# Patient Record
Sex: MALE | Race: WHITE | NOT HISPANIC OR LATINO | Employment: FULL TIME | URBAN - METROPOLITAN AREA
[De-identification: names, ages, dates, MRNs, and addresses within clinical notes are randomized per-mention and may not be internally consistent; named-entity substitution may affect disease eponyms.]

---

## 2020-04-06 ENCOUNTER — APPOINTMENT (OUTPATIENT)
Dept: GENERAL RADIOLOGY | Age: 60
End: 2020-04-06
Attending: EMERGENCY MEDICINE

## 2020-04-06 ENCOUNTER — HOSPITAL ENCOUNTER (EMERGENCY)
Age: 60
Discharge: HOME | End: 2020-04-06
Attending: EMERGENCY MEDICINE

## 2020-04-06 VITALS
HEART RATE: 86 BPM | WEIGHT: 208.34 LBS | DIASTOLIC BLOOD PRESSURE: 99 MMHG | RESPIRATION RATE: 20 BRPM | SYSTOLIC BLOOD PRESSURE: 154 MMHG | HEIGHT: 72 IN | TEMPERATURE: 98 F | OXYGEN SATURATION: 91 % | BODY MASS INDEX: 28.22 KG/M2

## 2020-04-06 DIAGNOSIS — R07.9 CHEST PAIN, UNSPECIFIED TYPE: Primary | ICD-10-CM

## 2020-04-06 DIAGNOSIS — B97.89 VIRAL RESPIRATORY INFECTION: ICD-10-CM

## 2020-04-06 DIAGNOSIS — J98.8 VIRAL RESPIRATORY INFECTION: ICD-10-CM

## 2020-04-06 LAB
ALBUMIN SERPL-MCNC: 4.2 G/DL (ref 3.6–5.1)
ALBUMIN/GLOB SERPL: 1.1 {RATIO} (ref 1–2.4)
ALP SERPL-CCNC: 100 UNITS/L (ref 45–117)
ALT SERPL-CCNC: 64 UNITS/L
ANION GAP SERPL CALC-SCNC: 12 MMOL/L (ref 10–20)
AST SERPL-CCNC: 29 UNITS/L
BASOPHILS # BLD: 0.1 K/MCL (ref 0–0.3)
BASOPHILS NFR BLD: 1 %
BILIRUB SERPL-MCNC: 0.5 MG/DL (ref 0.2–1)
BUN SERPL-MCNC: 14 MG/DL (ref 6–20)
BUN/CREAT SERPL: 13 (ref 7–25)
CALCIUM SERPL-MCNC: 9.7 MG/DL (ref 8.4–10.2)
CHLORIDE SERPL-SCNC: 106 MMOL/L (ref 98–107)
CO2 SERPL-SCNC: 23 MMOL/L (ref 21–32)
CREAT SERPL-MCNC: 1.08 MG/DL (ref 0.67–1.17)
DIFFERENTIAL METHOD BLD: ABNORMAL
EOSINOPHIL # BLD: 0.1 K/MCL (ref 0.1–0.5)
EOSINOPHIL NFR BLD: 2 %
ERYTHROCYTE [DISTWIDTH] IN BLOOD: 12.8 % (ref 11–15)
GLOBULIN SER-MCNC: 3.8 G/DL (ref 2–4)
GLUCOSE SERPL-MCNC: 112 MG/DL (ref 65–99)
HCT VFR BLD CALC: 53.9 % (ref 39–51)
HGB BLD-MCNC: 18.5 G/DL (ref 13–17)
IMM GRANULOCYTES # BLD AUTO: 0.1 K/MCL (ref 0–0.2)
IMM GRANULOCYTES NFR BLD: 1 %
LYMPHOCYTES # BLD: 1.6 K/MCL (ref 1–4)
LYMPHOCYTES NFR BLD: 24 %
MCH RBC QN AUTO: 32.1 PG (ref 26–34)
MCHC RBC AUTO-ENTMCNC: 34.3 G/DL (ref 32–36.5)
MCV RBC AUTO: 93.6 FL (ref 78–100)
MONOCYTES # BLD: 0.4 K/MCL (ref 0.3–0.9)
MONOCYTES NFR BLD: 6 %
NEUTROPHILS # BLD: 4.4 K/MCL (ref 1.8–7.7)
NEUTROPHILS NFR BLD: 66 %
NRBC BLD MANUAL-RTO: 0 /100 WBC
NT-PROBNP SERPL-MCNC: 23 PG/ML
PLATELET # BLD: 185 K/MCL (ref 140–450)
POTASSIUM SERPL-SCNC: 4.2 MMOL/L (ref 3.4–5.1)
PROT SERPL-MCNC: 8 G/DL (ref 6.4–8.2)
RBC # BLD: 5.76 MIL/MCL (ref 4.5–5.9)
SODIUM SERPL-SCNC: 137 MMOL/L (ref 135–145)
TROPONIN I SERPL HS-MCNC: <0.02 NG/ML
WBC # BLD: 6.7 K/MCL (ref 4.2–11)

## 2020-04-06 PROCEDURE — 80053 COMPREHEN METABOLIC PANEL: CPT

## 2020-04-06 PROCEDURE — 99285 EMERGENCY DEPT VISIT HI MDM: CPT

## 2020-04-06 PROCEDURE — 84484 ASSAY OF TROPONIN QUANT: CPT

## 2020-04-06 PROCEDURE — 71045 X-RAY EXAM CHEST 1 VIEW: CPT

## 2020-04-06 PROCEDURE — 85025 COMPLETE CBC W/AUTO DIFF WBC: CPT

## 2020-04-06 PROCEDURE — 36415 COLL VENOUS BLD VENIPUNCTURE: CPT

## 2020-04-06 PROCEDURE — 83880 ASSAY OF NATRIURETIC PEPTIDE: CPT

## 2020-04-06 PROCEDURE — 93005 ELECTROCARDIOGRAM TRACING: CPT | Performed by: EMERGENCY MEDICINE

## 2020-04-06 ASSESSMENT — PAIN SCALES - GENERAL: PAINLEVEL_OUTOF10: 4

## 2020-04-06 ASSESSMENT — PAIN DESCRIPTION - PAIN TYPE: TYPE: ACUTE PAIN

## 2020-04-07 LAB
ATRIAL RATE (BPM): 83
P AXIS (DEGREES): 52
PR-INTERVAL (MSEC): 130
QRS-INTERVAL (MSEC): 122
QT-INTERVAL (MSEC): 372
QTC: 437
R AXIS (DEGREES): 90
REPORT TEXT: NORMAL
T AXIS (DEGREES): 11
VENTRICULAR RATE EKG/MIN (BPM): 83

## 2021-08-08 ENCOUNTER — WALK IN (OUTPATIENT)
Dept: URGENT CARE | Age: 61
End: 2021-08-08
Attending: EMERGENCY MEDICINE

## 2021-08-08 VITALS
TEMPERATURE: 97.6 F | RESPIRATION RATE: 18 BRPM | OXYGEN SATURATION: 97 % | HEART RATE: 88 BPM | DIASTOLIC BLOOD PRESSURE: 83 MMHG | BODY MASS INDEX: 27.12 KG/M2 | WEIGHT: 200 LBS | SYSTOLIC BLOOD PRESSURE: 130 MMHG

## 2021-08-08 DIAGNOSIS — S61.209A EXTENSOR TENDON LACERATION OF FINGER WITH OPEN WOUND, INITIAL ENCOUNTER: ICD-10-CM

## 2021-08-08 DIAGNOSIS — S56.429A EXTENSOR TENDON LACERATION OF FINGER WITH OPEN WOUND, INITIAL ENCOUNTER: ICD-10-CM

## 2021-08-08 DIAGNOSIS — S61.012A LACERATION OF LEFT THUMB WITHOUT DAMAGE TO NAIL, FOREIGN BODY PRESENCE UNSPECIFIED, INITIAL ENCOUNTER: Primary | ICD-10-CM

## 2021-08-08 PROCEDURE — 90471 IMMUNIZATION ADMIN: CPT | Performed by: PHYSICIAN ASSISTANT

## 2021-08-08 PROCEDURE — 99212 OFFICE O/P EST SF 10 MIN: CPT

## 2021-08-08 PROCEDURE — 12002 RPR S/N/AX/GEN/TRNK2.6-7.5CM: CPT

## 2021-08-08 PROCEDURE — 10002800 HB RX 250 W HCPCS: Performed by: PHYSICIAN ASSISTANT

## 2021-08-08 PROCEDURE — 10002803 HB RX 637: Performed by: PHYSICIAN ASSISTANT

## 2021-08-08 PROCEDURE — 90715 TDAP VACCINE 7 YRS/> IM: CPT | Performed by: PHYSICIAN ASSISTANT

## 2021-08-08 PROCEDURE — 10002801 HB RX 250 W/O HCPCS: Performed by: PHYSICIAN ASSISTANT

## 2021-08-08 RX ORDER — LISINOPRIL 10 MG/1
10 TABLET ORAL DAILY
COMMUNITY
Start: 2021-07-19

## 2021-08-08 RX ORDER — BUPIVACAINE HYDROCHLORIDE 2.5 MG/ML
10 INJECTION, SOLUTION EPIDURAL; INFILTRATION; INTRACAUDAL ONCE
Status: COMPLETED | OUTPATIENT
Start: 2021-08-08 | End: 2021-08-08

## 2021-08-08 RX ORDER — IBUPROFEN 600 MG/1
600 TABLET ORAL ONCE
Status: COMPLETED | OUTPATIENT
Start: 2021-08-08 | End: 2021-08-08

## 2021-08-08 RX ORDER — CEPHALEXIN 500 MG/1
500 CAPSULE ORAL 4 TIMES DAILY
Qty: 28 CAPSULE | Refills: 0 | Status: SHIPPED | OUTPATIENT
Start: 2021-08-08 | End: 2021-08-15

## 2021-08-08 RX ORDER — ATORVASTATIN CALCIUM 20 MG/1
20 TABLET, FILM COATED ORAL DAILY
COMMUNITY
Start: 2021-07-19

## 2021-08-08 RX ORDER — IBUPROFEN 600 MG/1
600 TABLET ORAL EVERY 6 HOURS PRN
Qty: 20 TABLET | Refills: 0 | Status: SHIPPED | OUTPATIENT
Start: 2021-08-08 | End: 2021-08-13

## 2021-08-08 RX ADMIN — IBUPROFEN 600 MG: 600 TABLET, FILM COATED ORAL at 13:54

## 2021-08-08 RX ADMIN — BUPIVACAINE HYDROCHLORIDE 25 MG: 2.5 INJECTION, SOLUTION EPIDURAL; INFILTRATION; INTRACAUDAL; PERINEURAL at 13:18

## 2021-08-08 RX ADMIN — TETANUS TOXOID, REDUCED DIPHTHERIA TOXOID AND ACELLULAR PERTUSSIS VACCINE, ADSORBED 0.5 ML: 5; 2.5; 8; 8; 2.5 SUSPENSION INTRAMUSCULAR at 13:14

## 2021-08-08 ASSESSMENT — PAIN SCALES - GENERAL
PAINLEVEL: 7
PAINLEVEL: 7

## 2021-08-08 ASSESSMENT — ENCOUNTER SYMPTOMS
CONSTITUTIONAL NEGATIVE: 1
NEUROLOGICAL NEGATIVE: 1
WOUND: 1
RESPIRATORY NEGATIVE: 1
GASTROINTESTINAL NEGATIVE: 1

## 2022-06-22 ENCOUNTER — OFFICE VISIT (OUTPATIENT)
Dept: FAMILY MEDICINE CLINIC | Facility: CLINIC | Age: 62
End: 2022-06-22
Payer: COMMERCIAL

## 2022-06-22 VITALS
TEMPERATURE: 98 F | RESPIRATION RATE: 16 BRPM | SYSTOLIC BLOOD PRESSURE: 138 MMHG | DIASTOLIC BLOOD PRESSURE: 70 MMHG | WEIGHT: 200 LBS | OXYGEN SATURATION: 96 % | BODY MASS INDEX: 27.09 KG/M2 | HEART RATE: 82 BPM | HEIGHT: 72 IN

## 2022-06-22 DIAGNOSIS — R05.9 COUGH: ICD-10-CM

## 2022-06-22 DIAGNOSIS — J02.0 STREP THROAT: Primary | ICD-10-CM

## 2022-06-22 LAB
CONTROL LINE PRESENT WITH A CLEAR BACKGROUND (YES/NO): YES YES/NO
KIT LOT #: ABNORMAL NUMERIC
STREP GRP A CUL-SCR: POSITIVE

## 2022-06-22 PROCEDURE — 3008F BODY MASS INDEX DOCD: CPT | Performed by: NURSE PRACTITIONER

## 2022-06-22 PROCEDURE — 99202 OFFICE O/P NEW SF 15 MIN: CPT | Performed by: NURSE PRACTITIONER

## 2022-06-22 PROCEDURE — 3075F SYST BP GE 130 - 139MM HG: CPT | Performed by: NURSE PRACTITIONER

## 2022-06-22 PROCEDURE — 3078F DIAST BP <80 MM HG: CPT | Performed by: NURSE PRACTITIONER

## 2022-06-22 PROCEDURE — 87880 STREP A ASSAY W/OPTIC: CPT | Performed by: NURSE PRACTITIONER

## 2022-06-22 RX ORDER — LISINOPRIL 20 MG/1
20 TABLET ORAL DAILY
COMMUNITY
Start: 2022-05-26

## 2022-06-22 RX ORDER — ATORVASTATIN CALCIUM 20 MG/1
20 TABLET, FILM COATED ORAL DAILY
COMMUNITY
Start: 2022-05-26

## 2022-06-22 RX ORDER — AMOXICILLIN 500 MG/1
500 CAPSULE ORAL 3 TIMES DAILY
Qty: 30 CAPSULE | Refills: 0 | Status: SHIPPED | OUTPATIENT
Start: 2022-06-22 | End: 2022-07-02

## 2022-06-23 LAB — SARS-COV-2 RNA RESP QL NAA+PROBE: NOT DETECTED

## 2022-08-23 ENCOUNTER — OFFICE VISIT (OUTPATIENT)
Dept: FAMILY MEDICINE CLINIC | Facility: CLINIC | Age: 62
End: 2022-08-23
Payer: COMMERCIAL

## 2022-08-23 VITALS
SYSTOLIC BLOOD PRESSURE: 145 MMHG | DIASTOLIC BLOOD PRESSURE: 86 MMHG | BODY MASS INDEX: 25.67 KG/M2 | TEMPERATURE: 98 F | HEART RATE: 100 BPM | OXYGEN SATURATION: 97 % | HEIGHT: 74 IN | WEIGHT: 200 LBS | RESPIRATION RATE: 22 BRPM

## 2022-08-23 DIAGNOSIS — U07.1 COVID-19: ICD-10-CM

## 2022-08-23 DIAGNOSIS — Z11.52 ENCOUNTER FOR SCREENING FOR COVID-19: ICD-10-CM

## 2022-08-23 DIAGNOSIS — Z20.822 SUSPECTED COVID-19 VIRUS INFECTION: Primary | ICD-10-CM

## 2022-08-23 LAB
OPERATOR ID: ABNORMAL
POCT LOT NUMBER: ABNORMAL
RAPID SARS-COV-2 BY PCR: DETECTED

## 2022-08-23 PROCEDURE — U0002 COVID-19 LAB TEST NON-CDC: HCPCS | Performed by: NURSE PRACTITIONER

## 2022-08-23 PROCEDURE — 3079F DIAST BP 80-89 MM HG: CPT | Performed by: NURSE PRACTITIONER

## 2022-08-23 PROCEDURE — 3008F BODY MASS INDEX DOCD: CPT | Performed by: NURSE PRACTITIONER

## 2022-08-23 PROCEDURE — 3077F SYST BP >= 140 MM HG: CPT | Performed by: NURSE PRACTITIONER

## 2022-08-23 PROCEDURE — 99213 OFFICE O/P EST LOW 20 MIN: CPT | Performed by: NURSE PRACTITIONER

## 2023-01-21 ENCOUNTER — OFFICE VISIT (OUTPATIENT)
Dept: FAMILY MEDICINE CLINIC | Facility: CLINIC | Age: 63
End: 2023-01-21
Payer: COMMERCIAL

## 2023-01-21 VITALS
OXYGEN SATURATION: 100 % | RESPIRATION RATE: 20 BRPM | DIASTOLIC BLOOD PRESSURE: 81 MMHG | TEMPERATURE: 99 F | SYSTOLIC BLOOD PRESSURE: 167 MMHG | HEART RATE: 94 BPM

## 2023-01-21 DIAGNOSIS — I10 ELEVATED BLOOD PRESSURE READING WITH DIAGNOSIS OF HYPERTENSION: ICD-10-CM

## 2023-01-21 DIAGNOSIS — J20.8 ACUTE BRONCHITIS, VIRAL: Primary | ICD-10-CM

## 2023-01-21 PROCEDURE — 99213 OFFICE O/P EST LOW 20 MIN: CPT

## 2023-01-21 PROCEDURE — 3079F DIAST BP 80-89 MM HG: CPT

## 2023-01-21 PROCEDURE — 3077F SYST BP >= 140 MM HG: CPT

## 2023-01-22 ENCOUNTER — E-VISIT (OUTPATIENT)
Dept: TELEHEALTH | Age: 63
End: 2023-01-22

## 2023-01-22 DIAGNOSIS — Z02.9 ADMINISTRATIVE ENCOUNTER: Primary | ICD-10-CM

## 2023-01-22 NOTE — PROGRESS NOTES
Pt with sore throat, exposure to strep. Advised can place order for strep testing at [facility] or can been seen in person at [facility]. Pt prefers to go to [facility]. No charge.

## 2023-08-11 ENCOUNTER — OFFICE VISIT (OUTPATIENT)
Dept: FAMILY MEDICINE CLINIC | Facility: CLINIC | Age: 63
End: 2023-08-11
Payer: COMMERCIAL

## 2023-08-11 VITALS
DIASTOLIC BLOOD PRESSURE: 83 MMHG | HEIGHT: 72 IN | HEART RATE: 92 BPM | SYSTOLIC BLOOD PRESSURE: 138 MMHG | BODY MASS INDEX: 27.09 KG/M2 | WEIGHT: 200 LBS | RESPIRATION RATE: 18 BRPM | OXYGEN SATURATION: 97 %

## 2023-08-11 DIAGNOSIS — N30.00 ACUTE CYSTITIS WITHOUT HEMATURIA: ICD-10-CM

## 2023-08-11 DIAGNOSIS — R39.15 URGENCY OF URINATION: Primary | ICD-10-CM

## 2023-08-11 DIAGNOSIS — R35.0 URINARY FREQUENCY: ICD-10-CM

## 2023-08-11 LAB
BILIRUBIN: NEGATIVE
GLUCOSE (URINE DIPSTICK): NEGATIVE MG/DL
KETONES (URINE DIPSTICK): NEGATIVE MG/DL
MULTISTIX LOT#: ABNORMAL NUMERIC
NITRITE, URINE: NEGATIVE
PH, URINE: 5.5 (ref 4.5–8)
PROTEIN (URINE DIPSTICK): 30 MG/DL
SPECIFIC GRAVITY: 1.02 (ref 1–1.03)
UROBILINOGEN,SEMI-QN: 0.2 MG/DL (ref 0–1.9)

## 2023-08-11 PROCEDURE — 87086 URINE CULTURE/COLONY COUNT: CPT | Performed by: NURSE PRACTITIONER

## 2023-08-11 PROCEDURE — 81003 URINALYSIS AUTO W/O SCOPE: CPT | Performed by: NURSE PRACTITIONER

## 2023-08-11 PROCEDURE — 87186 SC STD MICRODIL/AGAR DIL: CPT | Performed by: NURSE PRACTITIONER

## 2023-08-11 PROCEDURE — 99213 OFFICE O/P EST LOW 20 MIN: CPT | Performed by: NURSE PRACTITIONER

## 2023-08-11 PROCEDURE — 3075F SYST BP GE 130 - 139MM HG: CPT | Performed by: NURSE PRACTITIONER

## 2023-08-11 PROCEDURE — 3079F DIAST BP 80-89 MM HG: CPT | Performed by: NURSE PRACTITIONER

## 2023-08-11 PROCEDURE — 87088 URINE BACTERIA CULTURE: CPT | Performed by: NURSE PRACTITIONER

## 2023-08-11 PROCEDURE — 3008F BODY MASS INDEX DOCD: CPT | Performed by: NURSE PRACTITIONER

## 2023-08-11 RX ORDER — TRAZODONE HYDROCHLORIDE 50 MG/1
1 TABLET ORAL DAILY PRN
COMMUNITY

## 2023-08-11 RX ORDER — SULFAMETHOXAZOLE AND TRIMETHOPRIM 800; 160 MG/1; MG/1
1 TABLET ORAL 2 TIMES DAILY
Qty: 28 TABLET | Refills: 0 | Status: SHIPPED
Start: 2023-08-11 | End: 2023-08-25

## 2023-11-09 ENCOUNTER — OFFICE VISIT (OUTPATIENT)
Dept: FAMILY MEDICINE CLINIC | Facility: CLINIC | Age: 63
End: 2023-11-09
Payer: COMMERCIAL

## 2023-11-09 VITALS
OXYGEN SATURATION: 98 % | DIASTOLIC BLOOD PRESSURE: 72 MMHG | HEART RATE: 82 BPM | WEIGHT: 203 LBS | SYSTOLIC BLOOD PRESSURE: 128 MMHG | RESPIRATION RATE: 16 BRPM | TEMPERATURE: 98 F | BODY MASS INDEX: 27.5 KG/M2 | HEIGHT: 72 IN

## 2023-11-09 DIAGNOSIS — R07.89 CHEST TIGHTNESS: ICD-10-CM

## 2023-11-09 DIAGNOSIS — J06.9 URI WITH COUGH AND CONGESTION: Primary | ICD-10-CM

## 2023-11-09 PROBLEM — R94.6 THYROID FUNCTION TEST ABNORMAL: Status: ACTIVE | Noted: 2022-03-09

## 2023-11-09 PROBLEM — K63.5 POLYP OF COLON: Status: ACTIVE | Noted: 2022-03-10

## 2023-11-09 PROBLEM — I10 ESSENTIAL HYPERTENSION: Status: ACTIVE | Noted: 2021-10-21

## 2023-11-09 PROBLEM — K64.4 EXTERNAL HEMORRHOIDS: Status: ACTIVE | Noted: 2022-03-10

## 2023-11-09 PROBLEM — E78.00 HYPERCHOLESTEROLEMIA: Status: ACTIVE | Noted: 2022-06-06

## 2023-11-09 PROBLEM — N18.31 STAGE 3A CHRONIC KIDNEY DISEASE (HCC): Status: ACTIVE | Noted: 2022-03-09

## 2023-11-09 PROBLEM — N28.9 RENAL INSUFFICIENCY SYNDROME: Status: ACTIVE | Noted: 2022-09-14

## 2023-11-09 PROBLEM — E78.5 HYPERLIPIDEMIA: Status: ACTIVE | Noted: 2021-10-21

## 2023-11-09 PROBLEM — I12.9 BENIGN HYPERTENSIVE RENAL DISEASE: Status: ACTIVE | Noted: 2022-03-09

## 2023-11-09 PROBLEM — M54.16 LUMBAR RADICULOPATHY: Status: ACTIVE | Noted: 2022-05-26

## 2023-11-09 LAB
OPERATOR ID: NORMAL
POCT LOT NUMBER: NORMAL
RAPID SARS-COV-2 BY PCR: NOT DETECTED

## 2023-11-09 PROCEDURE — 3008F BODY MASS INDEX DOCD: CPT | Performed by: NURSE PRACTITIONER

## 2023-11-09 PROCEDURE — 3078F DIAST BP <80 MM HG: CPT | Performed by: NURSE PRACTITIONER

## 2023-11-09 PROCEDURE — U0002 COVID-19 LAB TEST NON-CDC: HCPCS | Performed by: NURSE PRACTITIONER

## 2023-11-09 PROCEDURE — 3074F SYST BP LT 130 MM HG: CPT | Performed by: NURSE PRACTITIONER

## 2023-11-09 PROCEDURE — 99213 OFFICE O/P EST LOW 20 MIN: CPT | Performed by: NURSE PRACTITIONER

## 2023-11-09 RX ORDER — ALBUTEROL SULFATE 90 UG/1
1-2 AEROSOL, METERED RESPIRATORY (INHALATION)
Qty: 8 G | Refills: 0 | Status: SHIPPED | OUTPATIENT
Start: 2023-11-09

## 2023-11-09 RX ORDER — CYCLOBENZAPRINE HCL 10 MG
10 TABLET ORAL NIGHTLY
COMMUNITY

## 2023-11-09 RX ORDER — HYDROCODONE BITARTRATE AND ACETAMINOPHEN 10; 325 MG/1; MG/1
1 TABLET ORAL EVERY 6 HOURS PRN
COMMUNITY

## 2023-11-09 NOTE — PATIENT INSTRUCTIONS
Tylenol and Motrin as needed  Rest, push fluids  Mucinex DM over the counter for nasal congestion/cough  START daily antihistamine (Zyrtec, Claritin, Allegra) and choose one of those. Take daily for 1-2 weeks to help with any post nasal drainage/sore throat  START Flonase nasal spray daily.  1 spray in each nostril  If you have trouble breathing/chest tightness - use Albuterol inhaler as needed  Return to care for new/worsening symptoms or if symptoms persist for 2+ weeks without any improvement

## 2024-05-25 ENCOUNTER — OFFICE VISIT (OUTPATIENT)
Dept: FAMILY MEDICINE CLINIC | Facility: CLINIC | Age: 64
End: 2024-05-25

## 2024-05-25 ENCOUNTER — HOSPITAL ENCOUNTER (OUTPATIENT)
Age: 64
Discharge: HOME | End: 2024-05-25

## 2024-05-25 VITALS
DIASTOLIC BLOOD PRESSURE: 90 MMHG | SYSTOLIC BLOOD PRESSURE: 148 MMHG | TEMPERATURE: 98 F | RESPIRATION RATE: 16 BRPM | HEART RATE: 77 BPM | OXYGEN SATURATION: 98 %

## 2024-05-25 VITALS
HEART RATE: 82 BPM | RESPIRATION RATE: 18 BRPM | SYSTOLIC BLOOD PRESSURE: 150 MMHG | DIASTOLIC BLOOD PRESSURE: 90 MMHG | OXYGEN SATURATION: 96 % | TEMPERATURE: 97 F

## 2024-05-25 DIAGNOSIS — T16.2XXA FOREIGN BODY OF LEFT EAR, INITIAL ENCOUNTER: Primary | ICD-10-CM

## 2024-05-25 DIAGNOSIS — H66.003 NON-RECURRENT ACUTE SUPPURATIVE OTITIS MEDIA OF BOTH EARS WITHOUT SPONTANEOUS RUPTURE OF TYMPANIC MEMBRANES: Primary | ICD-10-CM

## 2024-05-25 DIAGNOSIS — T16.2XXA FOREIGN BODY OF LEFT EAR, INITIAL ENCOUNTER: ICD-10-CM

## 2024-05-25 RX ORDER — FLUTICASONE PROPIONATE 50 MCG
2 SPRAY, SUSPENSION (ML) NASAL DAILY
Qty: 16 G | Refills: 0 | Status: SHIPPED | OUTPATIENT
Start: 2024-05-25 | End: 2024-06-24

## 2024-05-25 RX ORDER — AMOXICILLIN AND CLAVULANATE POTASSIUM 875; 125 MG/1; MG/1
1 TABLET, FILM COATED ORAL 2 TIMES DAILY
Qty: 14 TABLET | Refills: 0 | Status: SHIPPED | OUTPATIENT
Start: 2024-05-25 | End: 2024-06-01

## 2024-05-25 NOTE — PROGRESS NOTES
Patient here for evaluation of possible foreign body in left ear. Noted several days ago was using a q-tip and unsure if the tip fractured. Notes mild pressure and hearing loss. Has tried to remove with no relief at home.  No past medical history on file.  Past Surgical History:   Procedure Laterality Date    Colonoscopy  1995    not certain of results    Other surgical history  2011    shoulder    Removal of elbow bursa  10/25/2012    Procedure: EXCISION OLECRANON BURSA;  Surgeon: Lj Tinsley MD;  Location: [facility] [place] [facility]     Current Outpatient Medications on File Prior to Visit   Medication Sig Dispense Refill    cyclobenzaprine 10 MG Oral Tab Take 1 tablet (10 mg total) by mouth nightly. TAKE AT BEDTIME (Patient not taking: Reported on 5/25/2024)      HYDROcodone-acetaminophen  MG Oral Tab Take 1 tablet by mouth every 6 (six) hours as needed. (Patient not taking: Reported on 5/25/2024)      albuterol 108 (90 Base) MCG/ACT Inhalation Aero Soln Inhale 1-2 puffs into the lungs every 4 to 6 hours as needed for Shortness of Breath or Wheezing. (Patient not taking: Reported on 5/25/2024) 8 g 0    traZODone 50 MG Oral Tab Take 1 tablet (50 mg total) by mouth daily as needed for Sleep.      atorvastatin 20 MG Oral Tab Take 1 tablet (20 mg total) by mouth daily.      lisinopril 20 MG Oral Tab Take 1 tablet (20 mg total) by mouth daily.      Multiple Vitamins-Minerals (MULTIVITAMIN OR) Take  by mouth. Daily (Patient not taking: Reported on 5/25/2024)      PROTEIN OR Take by mouth. Protein shake (Patient not taking: Reported on 11/9/2023)       No current facility-administered medications on file prior to visit.     /90   Pulse 77   Temp 97.8 °F (36.6 °C) (Oral)   Resp 16   SpO2 98%     Upon exam noted cotton in left ear. Unable to remove in office with alligator forceps. Patient sent to  for removal. No charge for visit.

## 2024-05-25 NOTE — ED INITIAL ASSESSMENT (HPI)
Pt c/o Q-tip stuck in ear a couple days ago, [facility] and rpt they were unsuccessful at getting it out, and recommended PT come to [facility]

## 2024-05-25 NOTE — ED PROVIDER NOTES
Patient Seen in: [facility]      History     Chief Complaint   Patient presents with    FB in Ear     Stated Complaint: foreign body in ear    Subjective:   HPI    63yo M who comes in with complaints of muffled hearing and foreign body in left ear canal from a qtip. Patient went to [facility] but they were unable to remove. Patient has had some ear pain prior to getting cotton stuck. Denies F/C.    Objective:   History reviewed. No pertinent past medical history.           Past Surgical History:   Procedure Laterality Date    Colonoscopy      not certain of results    Other surgical history      shoulder    Removal of elbow bursa  10/25/2012    Procedure: EXCISION OLECRANON BURSA;  Surgeon: Lj Tinsley MD;  Location: [facility]                Social History     Socioeconomic History    Marital status:    Tobacco Use    Smoking status: Light Smoker     Current packs/day: 0.00     Types: Cigarettes     Last attempt to quit: 2012     Years since quittin.6    Smokeless tobacco: Never    Tobacco comments:     cigar occassinally    Substance and Sexual Activity    Alcohol use: Yes     Comment: socially    Drug use: No     Social Determinants of Health      Received from [facility]              Review of Systems    Positive for stated complaint: foreign body in ear  Other systems are as noted in HPI.  Constitutional and vital signs reviewed.      All other systems reviewed and negative except as noted above.    Physical Exam     ED Triage Vitals [24 1119]   /90   Pulse 82   Resp 18   Temp 97.3 °F (36.3 °C)   Temp src    SpO2 96 %   O2 Device None (Room air)       Current Vitals:   Vital Signs  BP: 150/90  Pulse: 82  Resp: 18  Temp: 97.3 °F (36.3 °C)    Oxygen Therapy  SpO2: 96 %  O2 Device: None (Room air)            Physical Exam    General Appearance: Alert, cooperative, no distress, appropriate for age   Head: Normocephalic, without  obvious abnormality   Eyes: PERRL,  conjunctiva and cornea clear, both eyes   Ears: left ear canal occluded by qtip cotton, bilateral TM erythematous and purulent with dull light reflex    Nose: Nares symmetrical, septum midline, mucosa normal, clear watery discharge; no sinus tenderness   Lungs: Clear to auscultation bilaterally, respirations unlabored. No wheezing, rales or rhonchi.   Heart: NSR, S1, S2 present. No murmurs, rubs or gallops.  Skin: no rash       ED Course   Labs Reviewed - No data to display           [facility]          Medical Decision Making  63yo M who comes in with left foreign object in the ear canal     Problems Addressed:  Foreign body of left ear, initial encounter: acute illness or injury  Non-recurrent acute suppurative otitis media of both ears without spontaneous rupture of tympanic membranes: acute illness or injury    Amount and/or Complexity of Data Reviewed  ECG/medicine tests:      Details: Alligator forceps used to remove the cotton from left ear canal, patient tolerates well     Risk  OTC drugs.  Prescription drug management.  Risk Details: Clinical Impression: Acute Otitis media, left ear canal FBody      The differential diagnosis before testing included otitis media, eustachian tube dysfunction, otitis externa, mastoiditis which is a medical condition that poses a threat to function.     Discussed with the patient completing full course of antibiotics, Tylenol Motrin as needed for pain, if persistent worsening symptoms be reevaluated.          Disposition and Plan     Clinical Impression:  1. Non-recurrent acute suppurative otitis media of both ears without spontaneous rupture of tympanic membranes    2. Foreign body of left ear, initial encounter         Disposition:  Discharge  5/25/2024 11:36 am    Follow-up:  Kee Azul MD  [facility] [address] [address]  [phone]    Schedule an appointment as soon as possible for a visit   If symptoms  worsen          Medications Prescribed:  Discharge Medication List as of 5/25/2024 11:36 AM        START taking these medications    Details   fluticasone propionate 50 MCG/ACT Nasal Suspension 2 sprays by Nasal route daily., Normal, Disp-16 g, R-0      amoxicillin clavulanate 875-125 MG Oral Tab Take 1 tablet by mouth 2 (two) times daily for 7 days., Normal, Disp-14 tablet, R-0           This report has been produced using speech recognition software and may contain errors related to that system including, but not limited to, errors in grammar, punctuation, and spelling, as well as words and phrases that possibly may have been recognized inappropriately.  If there are any questions or concerns, contact the dictating provider for clarification.     NOTE: The 21st Century Cares Act makes medical notes available to patients.  Be advised that this is a medical document written in medical language and may contain abbreviations or verbiage that is unfamiliar or direct.  It is primarily intended to carry relevant historical information, physical exam findings, and the clinical assessment of the physician.

## 2025-06-19 ENCOUNTER — HOSPITAL ENCOUNTER (OUTPATIENT)
Age: 65
Discharge: HOME | End: 2025-06-19
Payer: COMMERCIAL

## 2025-06-19 VITALS
SYSTOLIC BLOOD PRESSURE: 160 MMHG | TEMPERATURE: 98 F | OXYGEN SATURATION: 98 % | DIASTOLIC BLOOD PRESSURE: 90 MMHG | RESPIRATION RATE: 18 BRPM | HEART RATE: 80 BPM

## 2025-06-19 DIAGNOSIS — T16.2XXA FOREIGN BODY OF LEFT EAR, INITIAL ENCOUNTER: Primary | ICD-10-CM

## 2025-06-19 PROCEDURE — 69200 CLEAR OUTER EAR CANAL: CPT | Performed by: PHYSICIAN ASSISTANT

## 2025-06-20 NOTE — ED PROVIDER NOTES
Patient Seen in: [facility]        History  Chief Complaint   Patient presents with    FB in Ear     Stated Complaint: q tip in ear    Subjective:   HPI          65-year-old male presents with retained Q-tip in left ear for over 1 month.  No pain reported        Objective:     History reviewed. No pertinent past medical history.           Past Surgical History:   Procedure Laterality Date    Colonoscopy      not certain of results    Other surgical history      shoulder    Removal of elbow bursa  10/25/2012    Procedure: EXCISION OLECRANON BURSA;  Surgeon: Lj Tinsley MD;  [facility] [place] [facility]                Social History     Socioeconomic History    Marital status:    Tobacco Use    Smoking status: Light Smoker     Current packs/day: 0.00     Types: Cigarettes     Last attempt to quit: 2012     Years since quittin.7    Smokeless tobacco: Never    Tobacco comments:     cigar occassinally    Substance and Sexual Activity    Alcohol use: Yes     Comment: socially    Drug use: No     Social Drivers of Health      [facility]              Review of Systems   All other systems reviewed and are negative.      Positive for stated complaint: q tip in ear  Other systems are as noted in HPI.  Constitutional and vital signs reviewed.      All other systems reviewed and negative except as noted above.                  Physical Exam    ED Triage Vitals [25 1900]   /90   Pulse 80   Resp 18   Temp 98.3 °F (36.8 °C)   Temp src    SpO2 98 %   O2 Device None (Room air)       Current Vitals:   Vital Signs  BP: 160/90  Pulse: 80  Resp: 18  Temp: 98.3 °F (36.8 °C)    Oxygen Therapy  SpO2: 98 %  O2 Device: None (Room air)            Physical Exam  Vitals and nursing note reviewed.   Constitutional:       General: He is not in acute distress.     Appearance: Normal appearance. He is normal weight. He is not ill-appearing,  toxic-appearing or diaphoretic.   HENT:      Head: Normocephalic and atraumatic.      Right Ear: Tympanic membrane, ear canal and external ear normal. There is no impacted cerumen.      Left Ear: Tympanic membrane and ear canal normal.      Ears:      Comments: Retained cotton noted in left ear canal     Nose: Congestion present. No rhinorrhea.      Mouth/Throat:      Mouth: Mucous membranes are moist.      Pharynx: Oropharynx is clear. No oropharyngeal exudate or posterior oropharyngeal erythema.   Eyes:      Extraocular Movements: Extraocular movements intact.      Conjunctiva/sclera: Conjunctivae normal.      Pupils: Pupils are equal, round, and reactive to light.   Cardiovascular:      Rate and Rhythm: Normal rate.      Pulses: Normal pulses.   Pulmonary:      Effort: Pulmonary effort is normal. No respiratory distress.      Breath sounds: Normal breath sounds. No stridor. No wheezing, rhonchi or rales.   Chest:      Chest wall: No tenderness.   Musculoskeletal:         General: No swelling, tenderness, deformity or signs of injury. Normal range of motion.      Cervical back: Normal range of motion and neck supple.      Right lower leg: No edema.      Left lower leg: No edema.   Skin:     General: Skin is warm and dry.      Capillary Refill: Capillary refill takes less than 2 seconds.      Coloration: Skin is not jaundiced or pale.      Findings: No bruising, erythema, lesion or rash.   Neurological:      General: No focal deficit present.      Mental Status: He is alert and oriented to person, place, and time. Mental status is at baseline.   Psychiatric:         Mood and Affect: Mood normal.         Behavior: Behavior normal.         Thought Content: Thought content normal.         Judgment: Judgment normal.                 ED Course  Labs Reviewed - No data to display                         MDM            Medical Decision Making  65-year-old male presents with retained Q-tip in left ear for over 1  month  Plan  - Direct visualization of Q-tip based on my physical exam.  Retrieved with alligator forceps.  Patient tolerated well.  Bilateral tympanic membranes intact  - Discharge home  - Refer to PCP for further evaluation as needed        Disposition and Plan     Clinical Impression:  1. Foreign body of left ear, initial encounter         Disposition:  Discharge  6/19/2025  7:25 pm    Follow-up:  Kee Azul MD  [address] [id number] [phone]          [address] [address]  [phone]              Medications Prescribed:  Discharge Medication List as of 6/19/2025  7:11 PM                Supplementary Documentation: